# Patient Record
Sex: FEMALE | Race: WHITE
[De-identification: names, ages, dates, MRNs, and addresses within clinical notes are randomized per-mention and may not be internally consistent; named-entity substitution may affect disease eponyms.]

---

## 2018-06-09 ENCOUNTER — HOSPITAL ENCOUNTER (EMERGENCY)
Dept: HOSPITAL 18 - NAV ERS | Age: 48
Discharge: HOME | End: 2018-06-09
Payer: COMMERCIAL

## 2018-06-09 DIAGNOSIS — F17.210: ICD-10-CM

## 2018-06-09 DIAGNOSIS — F31.9: ICD-10-CM

## 2018-06-09 DIAGNOSIS — R11.2: ICD-10-CM

## 2018-06-09 DIAGNOSIS — J44.9: ICD-10-CM

## 2018-06-09 DIAGNOSIS — R10.13: Primary | ICD-10-CM

## 2018-06-09 DIAGNOSIS — E03.9: ICD-10-CM

## 2018-06-09 DIAGNOSIS — Z79.899: ICD-10-CM

## 2018-06-09 DIAGNOSIS — F43.10: ICD-10-CM

## 2018-06-09 DIAGNOSIS — K58.9: ICD-10-CM

## 2018-06-09 LAB
ALBUMIN SERPL BCG-MCNC: 4 G/DL (ref 3.5–5)
ALP SERPL-CCNC: 55 U/L (ref 40–150)
ALT SERPL W P-5'-P-CCNC: 12 U/L (ref 8–55)
AMYLASE SERPL-CCNC: 64 U/L (ref 25–125)
ANION GAP SERPL CALC-SCNC: 14 MMOL/L (ref 10–20)
AST SERPL-CCNC: 14 U/L (ref 5–34)
BACTERIA UR QL AUTO: (no result) HPF
BASOPHILS # BLD AUTO: 0.1 THOU/UL (ref 0–0.2)
BASOPHILS NFR BLD AUTO: 0.8 % (ref 0–1)
BILIRUB SERPL-MCNC: 0.3 MG/DL (ref 0.2–1.2)
BUN SERPL-MCNC: 9 MG/DL (ref 7–18.7)
CALCIUM SERPL-MCNC: 9.2 MG/DL (ref 7.8–10.44)
CHLORIDE SERPL-SCNC: 105 MMOL/L (ref 98–107)
CO2 SERPL-SCNC: 24 MMOL/L (ref 22–29)
CREAT CL PREDICTED SERPL C-G-VRATE: 0 ML/MIN (ref 70–130)
DRUG SCREEN CUTOFF: (no result)
EOSINOPHIL # BLD AUTO: 0.2 THOU/UL (ref 0–0.7)
EOSINOPHIL NFR BLD AUTO: 1.7 % (ref 0–10)
GLOBULIN SER CALC-MCNC: 3 G/DL (ref 2.4–3.5)
GLUCOSE SERPL-MCNC: 100 MG/DL (ref 70–105)
HGB BLD-MCNC: 13.6 G/DL (ref 12–16)
LIPASE SERPL-CCNC: 64 U/L (ref 8–78)
LYMPHOCYTES # BLD AUTO: 2.1 THOU/UL (ref 1.2–3.4)
LYMPHOCYTES NFR BLD AUTO: 16.7 % (ref 21–51)
MCH RBC QN AUTO: 30.9 PG (ref 27–31)
MCV RBC AUTO: 92.2 FL (ref 81–99)
MEDTOX CONTROL LINE VALID?: (no result)
MONOCYTES # BLD AUTO: 1.1 THOU/UL (ref 0.11–0.59)
MONOCYTES NFR BLD AUTO: 8.8 % (ref 0–10)
NEUTROPHILS # BLD AUTO: 9 THOU/UL (ref 1.4–6.5)
NEUTROPHILS NFR BLD AUTO: 72 % (ref 42–75)
PLATELET # BLD AUTO: 259 THOU/UL (ref 130–400)
POTASSIUM SERPL-SCNC: 3.9 MMOL/L (ref 3.5–5.1)
RBC # BLD AUTO: 4.39 MILL/UL (ref 4.2–5.4)
RBC UR QL AUTO: (no result) HPF (ref 0–3)
SODIUM SERPL-SCNC: 139 MMOL/L (ref 136–145)
SP GR UR STRIP: 1.02 (ref 1–1.03)
T VAGINALIS URNS QL MICRO: (no result) HPF
WBC # BLD AUTO: 12.5 THOU/UL (ref 4.8–10.8)
WBC UR QL AUTO: (no result) HPF (ref 0–3)

## 2018-06-09 PROCEDURE — 74022 RADEX COMPL AQT ABD SERIES: CPT

## 2018-06-09 PROCEDURE — 80053 COMPREHEN METABOLIC PANEL: CPT

## 2018-06-09 PROCEDURE — 80306 DRUG TEST PRSMV INSTRMNT: CPT

## 2018-06-09 PROCEDURE — 81003 URINALYSIS AUTO W/O SCOPE: CPT

## 2018-06-09 PROCEDURE — 96361 HYDRATE IV INFUSION ADD-ON: CPT

## 2018-06-09 PROCEDURE — 84443 ASSAY THYROID STIM HORMONE: CPT

## 2018-06-09 PROCEDURE — 81015 MICROSCOPIC EXAM OF URINE: CPT

## 2018-06-09 PROCEDURE — 83690 ASSAY OF LIPASE: CPT

## 2018-06-09 PROCEDURE — 82150 ASSAY OF AMYLASE: CPT

## 2018-06-09 PROCEDURE — 96374 THER/PROPH/DIAG INJ IV PUSH: CPT

## 2018-06-09 PROCEDURE — 96375 TX/PRO/DX INJ NEW DRUG ADDON: CPT

## 2018-06-09 PROCEDURE — 85025 COMPLETE CBC W/AUTO DIFF WBC: CPT

## 2018-06-09 NOTE — RAD
CHEST ONE VIEW

ABDOMEN TWO VIEWS

6/9/18

 

HISTORY: 

Chest and abdomen pain.

 

FINDINGS:  

Cardiac silhouette magnified by projection. Pulmonary vasculature unremarkable. No confluent air spac
e consolidation or evidence of free subdiaphragmatic gas. 

 

Gas and stool throughout the colon and rectum. No differential air fluid levels or evidence of free i
ntraperitoneal gas. Phleboliths overlie the pelvis.

 

IMPRESSION:  

No significant abnormalities are demonstrated. 

 

POS: [facility]

## 2018-06-24 ENCOUNTER — HOSPITAL ENCOUNTER (EMERGENCY)
Dept: HOSPITAL 18 - NAV ERS | Age: 48
Discharge: HOME | End: 2018-06-24
Payer: COMMERCIAL

## 2018-06-24 DIAGNOSIS — J44.9: ICD-10-CM

## 2018-06-24 DIAGNOSIS — S93.401A: Primary | ICD-10-CM
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
## 2018-06-24 DIAGNOSIS — K58.9: ICD-10-CM

## 2018-06-24 DIAGNOSIS — F31.9: ICD-10-CM

## 2018-06-24 DIAGNOSIS — F17.210: ICD-10-CM

## 2018-06-24 DIAGNOSIS — F43.10: ICD-10-CM

## 2018-06-24 DIAGNOSIS — E03.9: ICD-10-CM

## 2018-06-24 NOTE — RAD
THREE VIEWS OF THE RIGHT ANKLE:

 

COMPARISON: 

None.

 

HISTORY: 

Right ankle injury with pain after playing basketball.

 

FINDINGS: 

Three views right ankle show no evidence of acute fracture or dislocation.  Mild lateral soft tissue 
swelling is seen.  No degenerative changes are seen.

 

IMPRESSION: 

No evidence of acute osseous abnormality.

 

POS: [facility]

## 2018-07-31 ENCOUNTER — HOSPITAL ENCOUNTER (OUTPATIENT)
Dept: HOSPITAL 18 - NAV RAD | Age: 48
Discharge: HOME | End: 2018-07-31
Payer: COMMERCIAL

## 2018-07-31 DIAGNOSIS — M51.37: ICD-10-CM

## 2018-07-31 DIAGNOSIS — M54.5: Primary | ICD-10-CM

## 2018-07-31 PROCEDURE — 72100 X-RAY EXAM L-S SPINE 2/3 VWS: CPT

## 2018-07-31 NOTE — RAD
THREE VIEWS OF THE LUMBAR SPINE:

7/31/18

 

COMPARISON:  

None.

 

HISTORY: 

Low back pain.

 

FINDINGS:  

There is disc space narrowing, degenerative end plate change, anterior osteophyte formation and vacuu
m disc formation at the lumbosacral junction. There is facet hypertrophy bilaterally at L5-S1, right 
greater than left. Five lumbar type vertebral bodies are present with intact pedicles on frontal imag
ing. No anterolisthesis or retrolisthesis noted. Mild disc space narrowing with anterior osteophyte n
oted at L2-3. 

 

IMPRESSION:  

Degenerative disc disease, most prominent at the L5-S1 level. No acute osseous abnormality. 

 

POS: [facility]

## 2018-11-28 ENCOUNTER — HOSPITAL ENCOUNTER (OUTPATIENT)
Dept: HOSPITAL 18 - NAV ULT | Age: 48
Discharge: HOME | End: 2018-11-28
Payer: COMMERCIAL

## 2018-11-28 DIAGNOSIS — E04.1: Primary | ICD-10-CM

## 2018-11-28 PROCEDURE — 76536 US EXAM OF HEAD AND NECK: CPT

## 2018-11-28 NOTE — ULT
THYROID ULTRASOUND:

 

Date:  11/28/18 

 

HISTORY:  

Thyroid nodules. 

 

FINDINGS:

Real-time imaging of the right and left lobes of the thyroid gland were performed. The gland is very 
heterogeneous without a focal discrete nodule identified. Right lobe measures 2.1 x 2.3 x 3.9 cm. Lef
t lobe measures 1.3 x 1.4 x 4.0 cm. 

 

IMPRESSION: 

Diffusely heterogeneous gland. It is difficult to define definite discrete nodules in this gland. 

 

 

POS: [facility]

## 2018-12-27 ENCOUNTER — HOSPITAL ENCOUNTER (EMERGENCY)
Dept: HOSPITAL 18 - NAV ERS | Age: 48
Discharge: HOME | End: 2018-12-27
Payer: COMMERCIAL

## 2018-12-27 DIAGNOSIS — R10.10: ICD-10-CM

## 2018-12-27 DIAGNOSIS — F17.210: ICD-10-CM

## 2018-12-27 DIAGNOSIS — F31.9: ICD-10-CM

## 2018-12-27 DIAGNOSIS — Z79.899: ICD-10-CM

## 2018-12-27 DIAGNOSIS — E03.9: ICD-10-CM

## 2018-12-27 DIAGNOSIS — R11.2: Primary | ICD-10-CM

## 2018-12-27 DIAGNOSIS — J44.9: ICD-10-CM

## 2018-12-27 DIAGNOSIS — F43.10: ICD-10-CM

## 2018-12-27 LAB
ALBUMIN SERPL BCG-MCNC: 4.3 G/DL (ref 3.5–5)
ALP SERPL-CCNC: 70 U/L (ref 40–150)
ALT SERPL W P-5'-P-CCNC: 18 U/L (ref 8–55)
ANION GAP SERPL CALC-SCNC: 16 MMOL/L (ref 10–20)
AST SERPL-CCNC: 16 U/L (ref 5–34)
BACTERIA UR QL AUTO: (no result) HPF
BASOPHILS # BLD AUTO: 0.1 THOU/UL (ref 0–0.2)
BASOPHILS NFR BLD AUTO: 0.6 % (ref 0–1)
BILIRUB SERPL-MCNC: 0.4 MG/DL (ref 0.2–1.2)
BUN SERPL-MCNC: 11 MG/DL (ref 7–18.7)
CALCIUM SERPL-MCNC: 9.6 MG/DL (ref 7.8–10.44)
CHLORIDE SERPL-SCNC: 106 MMOL/L (ref 98–107)
CO2 SERPL-SCNC: 20 MMOL/L (ref 22–29)
CREAT CL PREDICTED SERPL C-G-VRATE: 0 ML/MIN (ref 70–130)
EOSINOPHIL # BLD AUTO: 0 THOU/UL (ref 0–0.7)
EOSINOPHIL NFR BLD AUTO: 0.2 % (ref 0–10)
GLOBULIN SER CALC-MCNC: 3.2 G/DL (ref 2.4–3.5)
GLUCOSE SERPL-MCNC: 125 MG/DL (ref 70–105)
HGB BLD-MCNC: 14.3 G/DL (ref 12–16)
LIPASE SERPL-CCNC: 20 U/L (ref 8–78)
LYMPHOCYTES # BLD AUTO: 1.2 THOU/UL (ref 1.2–3.4)
LYMPHOCYTES NFR BLD AUTO: 8.8 % (ref 21–51)
MCH RBC QN AUTO: 29.9 PG (ref 27–31)
MCV RBC AUTO: 90.8 FL (ref 78–98)
MONOCYTES # BLD AUTO: 0.7 THOU/UL (ref 0.11–0.59)
MONOCYTES NFR BLD AUTO: 5 % (ref 0–10)
NEUTROPHILS # BLD AUTO: 11.9 THOU/UL (ref 1.4–6.5)
NEUTROPHILS NFR BLD AUTO: 85.5 % (ref 42–75)
PLATELET # BLD AUTO: 288 THOU/UL (ref 130–400)
POTASSIUM SERPL-SCNC: 3.6 MMOL/L (ref 3.5–5.1)
RBC # BLD AUTO: 4.78 MILL/UL (ref 4.2–5.4)
RBC UR QL AUTO: (no result) HPF (ref 0–3)
SODIUM SERPL-SCNC: 138 MMOL/L (ref 136–145)
SP GR UR STRIP: 1.02 (ref 1–1.03)
WBC # BLD AUTO: 13.9 THOU/UL (ref 4.8–10.8)
WBC UR QL AUTO: (no result) HPF (ref 0–3)

## 2018-12-27 PROCEDURE — 96361 HYDRATE IV INFUSION ADD-ON: CPT

## 2018-12-27 PROCEDURE — 76700 US EXAM ABDOM COMPLETE: CPT

## 2018-12-27 PROCEDURE — 93005 ELECTROCARDIOGRAM TRACING: CPT

## 2018-12-27 PROCEDURE — 96375 TX/PRO/DX INJ NEW DRUG ADDON: CPT

## 2018-12-27 PROCEDURE — 81003 URINALYSIS AUTO W/O SCOPE: CPT

## 2018-12-27 PROCEDURE — 83605 ASSAY OF LACTIC ACID: CPT

## 2018-12-27 PROCEDURE — 84443 ASSAY THYROID STIM HORMONE: CPT

## 2018-12-27 PROCEDURE — 96374 THER/PROPH/DIAG INJ IV PUSH: CPT

## 2018-12-27 PROCEDURE — 74019 RADEX ABDOMEN 2 VIEWS: CPT

## 2018-12-27 PROCEDURE — 80053 COMPREHEN METABOLIC PANEL: CPT

## 2018-12-27 PROCEDURE — 85025 COMPLETE CBC W/AUTO DIFF WBC: CPT

## 2018-12-27 PROCEDURE — 83690 ASSAY OF LIPASE: CPT

## 2018-12-27 PROCEDURE — 96376 TX/PRO/DX INJ SAME DRUG ADON: CPT

## 2018-12-27 PROCEDURE — 94760 N-INVAS EAR/PLS OXIMETRY 1: CPT

## 2018-12-27 PROCEDURE — 81015 MICROSCOPIC EXAM OF URINE: CPT

## 2018-12-27 NOTE — ULT
ABDOMINAL ULTRASOUND:

 

Date:  12/27/18 

 

HISTORY:  

Upper abdominal pain. 

 

FINDINGS:

The liver, spleen, gallbladder, kidneys, and visualized portions of the pancreas, aorta, and IVC appe
ar normal. The common duct measures 4.0 mm in diameter. No free fluid is seen. 

 

IMPRESSION: 

Normal exam. 

 

 

[facility]: [facility]

## 2019-12-27 ENCOUNTER — HOSPITAL ENCOUNTER (OUTPATIENT)
Dept: HOSPITAL 92 - SCSULT | Age: 49
Discharge: HOME | End: 2019-12-27
Attending: INTERNAL MEDICINE
Payer: COMMERCIAL

## 2019-12-27 DIAGNOSIS — E03.9: ICD-10-CM

## 2019-12-27 DIAGNOSIS — E04.2: Primary | ICD-10-CM

## 2019-12-27 PROCEDURE — 76536 US EXAM OF HEAD AND NECK: CPT

## 2019-12-27 NOTE — ULT
US Thyroid STANDARD: 12/27/2019 12:00 AM



CLINICAL INDICATION: Goiter and hypothyroidism.



COMPARISON: 11/28/2018



FINDINGS: 



Right and left thyroid lobes are diffusely heterogeneous. It is difficult to discriminate a discrete 
thyroid nodule. The right thyroid lobe measures 4.6 cm and the left thyroid lobe measures 4.2cm.



No cervical lymphadenopathy is noted.



IMPRESSION:



Stable heterogeneous appearance of the thyroid gland



Reported By: Fred Vogel 

Electronically Signed:  12/27/2019 3:43 PM

## 2022-01-01 NOTE — RAD
2 VIEW ABDOMEN:

 

Date:  12/27/18 

 

CLINICAL HISTORY:  

Abdominal pain. 

 

FINDINGS:

Imaged lower lung zones are clear. There is a paucity of bowel gas. Phlebolith formation is seen over
lying the pelvis. No free air beneath the hemidiaphragms. Osseous structures intact. 

 

IMPRESSION: 

No acute abnormality identified. 

 

 

POS: [facility]
Statement Selected